# Patient Record
Sex: FEMALE | Race: WHITE | NOT HISPANIC OR LATINO | Employment: FULL TIME | URBAN - METROPOLITAN AREA
[De-identification: names, ages, dates, MRNs, and addresses within clinical notes are randomized per-mention and may not be internally consistent; named-entity substitution may affect disease eponyms.]

---

## 2017-02-06 ENCOUNTER — VIRTUAL VISIT (OUTPATIENT)
Dept: FAMILY MEDICINE | Facility: OTHER | Age: 52
End: 2017-02-06

## 2017-02-06 NOTE — PROGRESS NOTES
"Date:   Clinician: Joel Wegener  Clinician NPI: [id number]  Patient: Esme Tovar  Patient : 1965  Patient Address: [address] [place] [id number]  Patient Phone: [phone]  Visit Protocol: URI  Patient Summary:  Esme is a 51 year old ( : 1965 ) female who initiated a Zip for a presumed sinus infection. When asked the question \"Do you have a [facility] primary care physician?\", Esme responded \"No\".     Her  symptoms started gradually 3 weeks ago  and consist of sore throat, nasal congestion, and post-nasal drainage.   She denies dysphagia, nausea, vomiting, itchy eyes, chills, malaise, fever, hoarse voice, ear pain, dyspnea, rhinitis, petechial or purpuric rash, cough, myalgias, chest pain, and loss of appetite. She denies a history of facial surgery.   Her moderate nasal secretions are yellow. Her moderate facial pain or pressure feels worse when bending over or leaning forward and is located on one side of her head. The facial pain or pressure started after the onset of other URI symptoms.  She has teeth pain and is confident the tooth pain is not from a cavity, recent dental work or other mouth problems. Esme has a mild headache. The headache did not start before her other symptoms and is located on one side of her head. Esme does not have a history of migraine headaches.   In the past year Esme has been diagnosed with one (1) episodes of sinusitis.   She has a mildly painful sore throat. The patient denies having white spots on the tonsils similar to a sample strep throat image provided. She has not been exposed to Strep. When asked to feel her neck she denied feeling enlarged lymph nodes. She denies axillary lymphadenopathy.    Esme denies having COPD or other chronic lung disease.   Pulse: self-reported pulse rate as: 12 beats in 10 seconds.    Weight (in lbs): 145   She states she is not pregnant and denies breastfeeding. She no " longer menstruates.   Esme does not smoke or use smokeless tobacco.   MEDICATIONS:  Birth control pill and citalopram (Celexa)  , ALLERGIES:  NKDA   Clinician Response:  Dear Esme,  Based on the information you have provided, you likely have  acute sinusitis, otherwise known as a sinus infection.   I am prescribing amoxicillin-Pot Clavulanate [Augmentin] 875-125mg. Take one tablet by mouth two times a day for 10 days. There are no refills with this prescription.   Sinus pressure occurs when the tissues lining your sinuses become swollen and inflamed. Afrin nasal spray decreases the swelling to provide the quickest and most effective relief from sinus pressure.  Use oxymetazoline (Afrin, or store brand) nasal spray. Spray once in each nostril twice per day for a maximum of 3 days. Using this medication more frequently or longer than recommended may cause nasal congestion to reoccur or worsen. This is an over-the-counter medication you can find at most pharmacies.   Unless your are allergic to the over-the-counter medication(s) below, I recommend using:   Ibuprofen. Take 1-3 200mg tablets (200-600mg) every 8 hours to help with the discomfort. Make sure to take the ibuprofen with food. Do not exceed 2400mg in 24 hours. This is an over-the-counter medication that does not require a prescription.   Try the following to help with your throat pain and discomfort:     Use throat lozenges    Gargle with warm salt water (1/4 teaspoon of salt per 8 ounce glass of water).    Suck on frozen items such as Popsicles or ice cubes.     Call 911 or go to the emergency room if you feel that your throat is closing off, you suddenly develop a rash, you are unable to swallow fluids, you are drooling, or you are having difficulty breathing.  Follow up with your primary care clinician if your symptoms are not improving in 3-4 days.   Drink plenty of liquids, especially water and take time to rest your body. This may mean taking a  nap or going to bed earlier. Your body is fighting an infection and liquids and rest will improve the pace of recovery. Remember to regularly wash your hands and avoid close contact with others to prevent spreading your infection.   Some people develop allergies to antibiotics. If you notice a new rash, significant swelling or difficulty breathing, stop the medication immediately and go into a clinic for physical evaluation.   Some women may also develop a yeast infection as a side effect of taking antibiotics. If you notice symptoms of a yeast infection, please use [facility] to get treatment.   If you become pregnant during this course of treatment with medication, stop taking the medication and contact your primary care clinician.   Diagnosis: Acute Sinusitis  Diagnosis ICD: J01.90  Prescription: amoxicillin-Pot Clavulanate (Augmentin) 875-125 mg oral tablet 20 tablets, 10 days supply. Take one tablet by mouth two times a day for 10 days. Refills: 0, Refill as needed: no, Allow substitutions: yes  Prescription Sent At: February 06 09:06:22, 2017  Pharmacy: [facility] - [phone] - [address] [place] [address]

## 2018-03-12 ENCOUNTER — VIRTUAL VISIT (OUTPATIENT)
Dept: FAMILY MEDICINE | Facility: OTHER | Age: 53
End: 2018-03-12

## 2018-03-12 NOTE — PROGRESS NOTES
"Date:   Clinician: Isak Jones  Clinician NPI: [id number]  Patient: Esme Tovar  Patient : 1965  Patient Address: [address] [place] [address], [place] [address]  Patient Phone: [phone]  Visit Protocol: URI  Patient Summary:  Esme is a 53 year old ( : 1965 ) female who initiated a Visit for cold, sinus infection, or influenza. When asked the question \"Please sign me up to receive news, health information and promotions. \", Esme responded \"No\".    Esme states her symptoms started gradually 10-13 days ago.   Her symptoms consist of malaise, a headache, rhinitis, tooth pain, a cough, nasal congestion, and facial pain or pressure.   Symptom details     Nasal secretions: The color of her mucus is yellow.    Cough: Esme coughs a few times an hour and her cough is not more bothersome at night. Phlegm does not come into her throat when she coughs.     Facial pain or pressure: The facial pain or pressure feels worse when bending over or leaning forward.     Headache: She states the headache is moderate (between 4-6 on a 10 point pain scale).     Tooth pain: The tooth pain is not caused by a cavity, recent dental work, or other mouth problems.      Esme denies having wheezing, myalgias, enlarged lymph nodes, sore throat, fever, dyspnea, ear pain, and chills. She also denies taking antibiotic medication for the symptoms, double sickening (worsening symptoms after initial improvement), and having recent facial or sinus surgery in the past 60 days.   She has not recently been exposed to someone with influenza. Esme has been in close contact with the following high risk individuals: people with asthma, heart disease or diabetes.   Esme had 1 sinus infection within the past year.   Weight: 148 lbs   Esme does not smoke or use smokeless tobacco.   She denies pregnancy and denies breastfeeding. She does not menstruate.   MEDICATIONS:  Birth control pill " and citalopram (Celexa)  , ALLERGIES:  NKDA   Clinician Response:  Dear Esme,  Based on the information provided, you have acute bacterial sinusitis, also known as a sinus infection. Sinus infections are caused by bacteria or a virus and symptoms are almost always identical. The difference between the 2 types of infections is timing.  Sinus infections start as viral infections and symptoms improve on their own in about 7 days. If symptoms have not improved after 7 days or have even worsened, a bacterial infection may have developed.  Medication information  I am prescribing:     Amoxicillin 500 mg oral tablet. Take 1 tablet by mouth 3 times a day for 10 days. There are no refills with this prescription.   Antibiotics can cause an allergic reaction even if you have taken them without a problem in the past. If you develop a new rash, swelling, or difficulty breathing, stop the medication and be seen in a clinic or urgent care immediately.  A yeast infection is a side effect of taking antibiotics in some women. Please use [facility] to get treatment if you have symptoms of a yeast infection.  If you become pregnant during this course of treatment, stop taking the medication and contact your primary care provider.  Self care  The following tips will keep you as comfortable as possible while you recover:     Rest    Drink plenty of water and other liquids    Take a hot shower to loosen congestion    Take a spoonful of honey to reduce your cough     When to seek care  Please be seen in a clinic or urgent care if any of the following occur:     Symptoms do not start to improve after 3 days of treatment    New symptoms develop, or symptoms become worse      Diagnosis: Acute bacterial sinusitis  Diagnosis ICD: J01.90  Prescription: amoxicillin 500 mg oral tablet 30 tablets, 10 days supply. Take 1 tablet by mouth 3 times a day for 10 days. Refills: 0, Refill as needed: no, Allow substitutions: yes  Pharmacy: [facility]  [address] - [phone] - [address], [place] [address]

## 2019-05-23 ENCOUNTER — OFFICE VISIT (OUTPATIENT)
Dept: DERMATOLOGY | Facility: CLINIC | Age: 54
End: 2019-05-23
Payer: COMMERCIAL

## 2019-05-23 DIAGNOSIS — D23.9 DILATED PORE OF WINER: ICD-10-CM

## 2019-05-23 DIAGNOSIS — D18.01 CHERRY ANGIOMA: ICD-10-CM

## 2019-05-23 DIAGNOSIS — L57.8 SUN-DAMAGED SKIN: ICD-10-CM

## 2019-05-23 DIAGNOSIS — D48.9 NEOPLASM OF UNCERTAIN BEHAVIOR: ICD-10-CM

## 2019-05-23 DIAGNOSIS — L81.4 SOLAR LENTIGO: Primary | ICD-10-CM

## 2019-05-23 DIAGNOSIS — D22.9 MULTIPLE BENIGN NEVI: ICD-10-CM

## 2019-05-23 DIAGNOSIS — L81.1 MELASMA: ICD-10-CM

## 2019-05-23 DIAGNOSIS — L82.1 SEBORRHEIC KERATOSIS: ICD-10-CM

## 2019-05-23 PROCEDURE — 88305 TISSUE EXAM BY PATHOLOGIST: CPT | Mod: TC | Performed by: DERMATOLOGY

## 2019-05-23 PROCEDURE — 11102 TANGNTL BX SKIN SINGLE LES: CPT | Performed by: DERMATOLOGY

## 2019-05-23 PROCEDURE — 99203 OFFICE O/P NEW LOW 30 MIN: CPT | Mod: 25 | Performed by: DERMATOLOGY

## 2019-05-23 RX ORDER — CITALOPRAM HYDROBROMIDE 20 MG/1
TABLET ORAL
Refills: 3 | COMMUNITY
Start: 2019-05-04

## 2019-05-23 RX ORDER — ESTRADIOL 0.07 MG/D
FILM, EXTENDED RELEASE TRANSDERMAL
Refills: 0 | COMMUNITY
Start: 2019-03-24

## 2019-05-23 NOTE — NURSING NOTE
@Esme Tovar's goals for this visit include:   Chief Complaint   Patient presents with     Skin Check     lesion of concern on back       She requests these members of her care team be copied on today's visit information: NO    PCP: Raegan De Souza    Referring Provider:  No referring provider defined for this encounter.    There were no vitals taken for this visit.    Do you need any medication refills at today's visit? NO    Nyasia Guerrero CMA

## 2019-05-23 NOTE — PROGRESS NOTES
[facility] Dermatology Note      Dermatology Problem List:  1. NUB, left zygomatic cheek, s/p biopsy 5/23/2019  2. Melasma: Not treating      Encounter Date: May 23, 2019    CC:  Chief Complaint   Patient presents with     Skin Check     lesion of concern on back         History of Present Illness:  Ms. Esme Tovar is a 54 year old female who presents in self referral for a skin check. Today, she has a lesion of concern on her back. It is raised and has a black center. She was previously seen at [facility] and a lesion biopsied on her nose. She was told it was inflammation, like a pimple. No other concerns addressed today.      Past Medical History:   Patient Active Problem List   Diagnosis     Myopia     Presbyopia     Social History:  Patient reports that she has never smoked. She has never used smokeless tobacco. She reports that she drinks alcohol.works for [employer]. 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       Medications:  Current Outpatient Medications   Medication Sig Dispense Refill     citalopram (CELEXA) 20 MG tablet   3     estradiol (VIVELLE-DOT) 0.075 MG/24HR BIW patch   0       No Known Allergies    Review of Systems:  -Constitutional: Patient is otherwise feeling well, in usual state of health.   -Skin: As above in HPI. No additional skin concerns.    Physical exam:  Vitals: There were no vitals taken for this visit.  GEN: This is a well developed, well-nourished female in no acute distress, in a pleasant mood.    SKIN: Total skin excluding the undergarment areas was performed. The exam included the head/face, neck, both arms, chest, back, abdomen, both legs, digits and/or nails and buttocks.   - Mchugh Type II  - Scattered brown macules on sun exposed areas.  - Faint hyperpigmentation on the lateral and inferior cheeks  - 3 mm pink pearly papule with sharply in focus vessels with black clods in it  -There are dome shaped  bright red papules on the trunk.   - Multiple regular brown pigmented macules and papules are identified on the trunk.   - There are waxy stuck on tan to brown papules on the trunk.  - dilated pore of stephen on her left mid back (area of pt concern)  - No other lesions of concern on areas examined.               Impression/Plan:    1. Sun damaged skin with solar lentigines    Recommend sunscreens SPF #30 or greater, protective clothing and avoidance of tanning beds.    2. Benign findings including: seborrheic keratoses, cherry angioma, dilated pore of stephen    No further intervention required. Patient to report changes.     Patient reassured of the benign nature of these lesions.    3. Multiple clinically benign nevi    No further intervention required. Patient to report changes.     Patient reassured ofthe  benign nature of these lesions.    4. Melasma. Discussed pathogenesis of this condition.     Recommend sunscreens SPF #30 or greater, protective clothing and avoidance of tanning beds.    5. NUB, left zygomatic cheek. 3 mm pink pearly papule with sharply in focus vessels with black clods in it. Ddx: BCC vs nevus    Shave biopsy:  After discussion of benefits and risks including but not limited to bleeding/bruising, pain/swelling, infection, scar, incomplete removal, nerve damage/numbness, recurrence, and non-diagnostic biopsy, written consent, verbal consent and photographs were obtained. Time-out was performed. The area was cleaned with isopropyl alcohol. 0.5ml of 1% lidocaine with 1:100,000 epinephrine was injected to obtain adequate anesthesia. A shave biopsy was performed. Hemostasis was achieved with aluminium chloride. Vaseline and a sterile dressing were applied. The patient tolerated the procedure and no complications were noted. The patient was provided with verbal and written post care instructions.    Follow-up pending biopsy results, 1 year for skin exam, sooner for lesions of concern.       Staff  Involved:  Scribe/Staff    Scribe Disclosure  I, Deloris Jairo, am serving as a scribe to document services personally performed by Dr. Na Camacho MD, based on data collection and the provider's statements to me.     Provider Disclosure:   The documentation recorded by the scribe accurately reflects the services I personally performed and the decisions made by me.    Na Camacho MD    Department of Dermatology  [facility]: Phone: [phone], Fax:[phone]  [facility]: Phone: [phone], Fax: [phone]

## 2019-05-23 NOTE — PATIENT INSTRUCTIONS

## 2019-05-23 NOTE — LETTER
5/23/2019         RE: Esme Tovar  [address] [facility] [facility] [address]        Dear Colleague,    Thank you for referring your patient, Esme Tovar, to the [facility]. Please see a copy of my visit note below.    [facility] Dermatology Note      Dermatology Problem List:  1. NUB, left zygomatic cheek, s/p biopsy 5/23/2019  2. Melasma: Not treating      Encounter Date: May 23, 2019    CC:  Chief Complaint   Patient presents with     Skin Check     lesion of concern on back         History of Present Illness:  Ms. Esme Tovar is a 54 year old female who presents in self referral for a skin check. Today, she has a lesion of concern on her back. It is raised and has a black center. She was previously seen at [facility] and a lesion biopsied on her nose. She was told it was inflammation, like a pimple. No other concerns addressed today.      Past Medical History:   Patient Active Problem List   Diagnosis     Myopia     Presbyopia     Social History:  Patient reports that she has never smoked. She has never used smokeless tobacco. 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       Medications:  Current Outpatient Medications   Medication Sig Dispense Refill     citalopram (CELEXA) 20 MG tablet   3     estradiol (VIVELLE-DOT) 0.075 MG/24HR BIW patch   0       No Known Allergies    Review of Systems:  -Constitutional: Patient is otherwise feeling well, in usual state of health.   -Skin: As above in HPI. No additional skin concerns.    Physical exam:  Vitals: There were no vitals taken for this visit.  GEN: This is a well developed, well-nourished female in no acute distress, in a pleasant mood.    SKIN: Total skin excluding the undergarment areas was performed. The exam included the head/face, neck, both arms, chest, back, abdomen, both legs, digits and/or  nails and buttocks.   - Mchugh Type II  - Scattered brown macules on sun exposed areas.  - Faint hyperpigmentation on the lateral and inferior cheeks  - 3 mm pink pearly papule with sharply in focus vessels with black clods in it  -There are dome shaped bright red papules on the trunk.   - Multiple regular brown pigmented macules and papules are identified on the trunk.   - There are waxy stuck on tan to brown papules on the trunk.  - dilated pore of stephen on her left mid back (area of pt concern)  - No other lesions of concern on areas examined.               Impression/Plan:    1. Sun damaged skin with solar lentigines    Recommend sunscreens SPF #30 or greater, protective clothing and avoidance of tanning beds.    2. Benign findings including: seborrheic keratoses, cherry angioma, dilated pore of stephen    No further intervention required. Patient to report changes.     Patient reassured of the benign nature of these lesions.    3. Multiple clinically benign nevi    No further intervention required. Patient to report changes.     Patient reassured ofthe  benign nature of these lesions.    4. Melasma. Discussed pathogenesis of this condition.     Recommend sunscreens SPF #30 or greater, protective clothing and avoidance of tanning beds.    5. NUB, left zygomatic cheek. 3 mm pink pearly papule with sharply in focus vessels with black clods in it. Ddx: BCC vs nevus    Shave biopsy:  After discussion of benefits and risks including but not limited to bleeding/bruising, pain/swelling, infection, scar, incomplete removal, nerve damage/numbness, recurrence, and non-diagnostic biopsy, written consent, verbal consent and photographs were obtained. Time-out was performed. The area was cleaned with isopropyl alcohol. 0.5ml of 1% lidocaine with 1:100,000 epinephrine was injected to obtain adequate anesthesia. A shave biopsy was performed. Hemostasis was achieved with aluminium chloride. Vaseline and a sterile dressing  were applied. The patient tolerated the procedure and no complications were noted. The patient was provided with verbal and written post care instructions.    Follow-up pending biopsy results, 1 year for skin exam, sooner for lesions of concern.       Staff Involved:  Scribe/Staff    Scribe Disclosure  I, Deloris Gill, am serving as a scribe to document services personally performed by Dr. Na Camacho MD, based on data collection and the provider's statements to me.     Provider Disclosure:   The documentation recorded by the scribe accurately reflects the services I personally performed and the decisions made by me.    Na Camacho MD    Department of Dermatology  [facility]: Phone: [phone], Fax:[phone]  [facility]: Phone: [phone], Fax: [phone]              Again, thank you for allowing me to participate in the care of your patient.        Sincerely,        Na Camacho MD

## 2019-05-29 LAB — COPATH REPORT: NORMAL

## 2019-06-27 ENCOUNTER — OFFICE VISIT (OUTPATIENT)
Dept: DERMATOLOGY | Facility: CLINIC | Age: 54
End: 2019-06-27
Payer: COMMERCIAL

## 2019-06-27 VITALS — HEART RATE: 63 BPM | SYSTOLIC BLOOD PRESSURE: 120 MMHG | OXYGEN SATURATION: 99 % | DIASTOLIC BLOOD PRESSURE: 68 MMHG

## 2019-06-27 DIAGNOSIS — C44.319 BASAL CELL CARCINOMA (BCC) OF LEFT LATERAL CHEEK: Primary | ICD-10-CM

## 2019-06-27 PROCEDURE — 13131 CMPLX RPR F/C/C/M/N/AX/G/H/F: CPT | Mod: 51 | Performed by: DERMATOLOGY

## 2019-06-27 PROCEDURE — 17311 MOHS 1 STAGE H/N/HF/G: CPT | Performed by: DERMATOLOGY

## 2019-06-27 ASSESSMENT — PAIN SCALES - GENERAL: PAINLEVEL: NO PAIN (0)

## 2019-06-27 NOTE — PROGRESS NOTES
[facility] Mohs Dermatologic Surgery Procedure Note    Date of Service:  Jun 27, 2019  Surgery: Mohs micrographic surgery    Case 1  Repair Type: complex  Repair Size: 1.6 cm  Suture Material: Fast Absorbing Gut 5-0, 5-0 Monocryl  Tumor Type: BCC - Basal cell carcinoma  Location: left zygomatic cheek  Derm-Path Accession #: [id number]  PreOp Size: 0.4x0.4 cm  PostOp Size: 0.8x0.8 cm  Mohs Accession #: [id number]  Level of Defect: fat      Procedure:  We discussed the principles of treatment and most likely complications including scarring, bleeding, infection, swelling, pain, crusting, nerve damage, large wound,  incomplete excision, wound dehiscence,  nerve damage, recurrence, and a second procedure may be recommended to obtain the best cosmetic or functional result.    Informed consent was obtained and the patient underwent the procedure as follows:  The patient was placed supine on the operating table.  The cancer was identified, outlined with a marker, and verified by the patient.  The entire surgical field was prepped with Hibiclens.  The surgical site was anesthetized using Lidocaine 1% with epi 1:100,000.      The area of clinically apparent tumor was debulked. The layer of tissue was then surgically excised using a #15 blade and was then transferred onto a specimen sheet maintaining the orientation of the specimen. Hemostasis was obtained using bipolar electrocoagulation. The wound site was then covered with a dressing while the tissue samples were processed for examination.    The excised tissue was transported to the Mohs histology laboratory maintaining the tissue orientation.  The tissue specimen was relaxed so that the entire surgical margin was in a a single horizontal plane for sectioning and inked for precise mapping.  A precise reference map was drawn to reflect the sectioning of the specimen, colored inking of the margins, and orientation on the patient. The tissue was processed  using horizontal sectioning of the base and continuous peripheral margins.  The histopathologic sections were reviewed in conjunction with the reference map.    Total blocks: 1    Total slides:  1    There were no cancer cells visualized on examination, therefore Mohs surgery was complete.    Reconstruction: Complex Closure    Primary Surgeon: Dr. Chase Monge     The patient was taken to the operative suite and placed on the operating room table. The defect was identified. Appropriate markings were made with a marking pen to plan the repair.  The area was infiltrated with Lidocaine 1% with epi 1:100,000, prepped with Hibiclens, and draped with sterile towels.     The wound was debeveled and undermined widely. Cones were excised within relaxed skin tension lines on both sides of the defect. Hemostasis was obtained using bipolar electrocoagulation. The wound was narrowed with SMAS placation and the dermis and subcutaneous tissue were then approximated using buried vertical mattress sutures. The wound edges were then approximated additional buried sutures were placed in a similar fashion where needed.  Percutaneous sutures were carefully placed for maximum eversion and meticulous approximation.    Repair Size: 1.6 cm  Sutures Used: 5-0 Monocryl, 5-0 Fast Absorbing Gut      The wound was cleansed with saline and ointment was applied along the wound surface.     A sterile pressure dressing was applied. Wound care instructions were given verbally and in writing. The patient left the operating suite in stable condition. Patient was informed that additional refinement of the resulting surgical scar may be used as a second stage of this reconstruction.           Staff involved:  Scribe Disclosure:  Luciana FLORIAN, am serving as a scribe to document services personally performed by Dr. Chase Monge DO, based on data collection and the provider's statements to me.     Provider Disclosure:   The documentation recorded by the  sharad accurately reflects the services I personally performed and the decisions made by me.  I personally performed the procedures today.    Chase Monge DO    Department of Dermatology  [facility]: Phone: [phone], Fax:[phone]  [facility]: Phone: [phone], Fax: [phone]    Performed at:   [facility]    [address] [place] [address]

## 2019-06-27 NOTE — NURSING NOTE
Esme Tovar's goals for this visit include:   Chief Complaint   Patient presents with     Procedure     MOHS left zygomatic cheek BCC       She requests these members of her care team be copied on today's visit information:     PCP: Raegan De Souza    Referring Provider:  No referring provider defined for this encounter.    /68 (BP Location: Right arm, Patient Position: Sitting, Cuff Size: Adult Regular)   Pulse 63   SpO2 99%     Do you need any medication refills at today's visit? No    Mariaelena Hughes LPN

## 2019-06-27 NOTE — PATIENT INSTRUCTIONS
Mohs Wound Care Instructions  I will experience scar, altered skin color, bleeding, swelling, pain, crusting and redness. I may experience altered sensation. Risks are excessive bleeding, infection, muscle weakness, thick (hypertrophic or keloidal) scar, and recurrence,. A second procedure may be recommended to obtain the best cosmetic or functional result.  Possible complications of any surgical procedure are bleeding, infection, scarring, alteration in skin color and sensation, muscle weakness in the area, wound dehiscence or seperation, or recurrence of the lesion or disease. On occasion, after healing, a secondary procedure or revision may be recommended in order to obtain the best cosmetic or functional result.   After your surgery, a pressure bandage will be placed over the area that has sutures. This will help prevent bleeding. Please follow these instructions until you come back to clinic for suture removal in 1 week, as they will help you to prevent complications as your wound heals.  For the First 48 hours After Surgery:  1. Leave the pressure bandage on and keep it dry. If it should come loose, you may retape it, but do not take it off.  2. Relax and take it easy. Do not do any vigorous exercise, heavy lifting, or bending forward. This could cause the wound to bleed.  3. Post-operative pain is usually mild. You may take plain or extra strength Tylenol every 4 hours as needed (do not take more than 4,000mg in one day). Do not take any medicine that contains aspirin, ibuprofen or motrin unless you have been recommended these by a doctor.  Avoid alcohol and vitamin E as these may increase your tendency to bleed.  4. You may put an ice pack around the bandaged area for 20 minutes every 2-3 hours. This may help reduce swelling, bruising, and pain. Make sure the ice pack is waterproof so that the pressure bandage does not get wet.   5. You may see a small amount of drainage or blood on your pressure bandage.  This is normal. However, if drainage or bleeding continues or saturates the bandage, you will need to apply firm pressure over the bandage with a washcloth for 15 minutes. If bleeding continues after applying pressure for 15 minutes then go to the nearest emergency room.  48 Hours After Surgery  Carefully remove the bandage and start daily wound care and dressing changes. You may also now shower and get the wound wet. Wash wound with a mild soap and water.  Use caution when washing the wound. Be gentle and do not let the forceful shower stream hit the wound directly.  PAT dry.  Daily Wound Care:  1. Wash wound with a mild soap and water.  Use caution when washing the wound, be gentle and do not let the forceful shower stream hit the wound directly.  2. PAT DRY.  3. Apply Vaseline (from a new container or tube) over the suture line with a Q-tip. It is very important to keep the wound continuously moist, as wounds heal best in a moist environment.  4.  Keep the site covered until sutures are removed, you can cover it with a Telfa (non-stick) dressing and tape or a band-aid.    5. If you are unable to keep wound covered, you must apply Vaseline every 2 - 3 hours (while awake) to ensure it is being kept moist for optimal healing. A dressing overnight is recommended to keep the area moist.   Call Us If:  1. You have pain that is not controlled with Tylenol.  2. You have signs or symptoms of an infection, such as: fever over 100 degrees F, redness, warmth, or foul-smelling or yellow/creamy drainage from the wound.  Who should I call with questions?    [facility]: [phone]     [facility]: [phone]    For urgent needs outside of business hours call the [facility] at [phone] and ask for the dermatology resident on call

## 2019-06-27 NOTE — NURSING NOTE
Mupirocin, Vaseline and pressure dressing applied to Mohs site on left zygomatic cheek.  Wound care instructions reviewed with patient and AVS provided.  Patient verbalized understanding. No further questions or concerns at this time.      The following medication was given:     MEDICATION:  Lidocaine with epinephrine 1% 1:[id number]  ROUTE: SQ  SITE: see procedure note  DOSE: 2cc  LOT #: -[id number]  : Hospira  EXPIRATION DATE: 1-apr-2020  NDC#: 6264-3220-90   Was there drug waste? 1cc  Multi-dose vial: Yes    Mariaelena Hughes LPN  June 27, 2019    Prior to injection, verified patient identity using patient's name and date of birth.      Lido with epi 1%    Drug Amount Wasted:  Yes: 1 mg/ml   Vial/Syringe: Multi dose vial  Expiration Date:  1-apr-2020    Mariaelena Hughes LPN

## 2019-06-27 NOTE — LETTER
6/27/2019         RE: Esme Tovar  [address] [address] [place] [id number]        Dear Colleague,    Thank you for referring your patient, Esme Tovar, to the [facility]. Please see a copy of my visit note below.    [facility] Dermatologic Surgery Procedure Note    Date of Service:  Jun 27, 2019  Surgery: Mohs micrographic surgery    Case 1  Repair Type: complex  Repair Size: 1.6 cm  Suture Material: Fast Absorbing Gut 5-0, 5-0 Monocryl  Tumor Type: BCC - Basal cell carcinoma  Location: left zygomatic cheek  Derm-Path Accession #: [id number]  PreOp Size: 0.4x0.4 cm  PostOp Size: 0.8x0.8 cm  Mohs Accession #: [id number]  Level of Defect: fat      Procedure:  We discussed the principles of treatment and most likely complications including scarring, bleeding, infection, swelling, pain, crusting, nerve damage, large wound,  incomplete excision, wound dehiscence,  nerve damage, recurrence, and a second procedure may be recommended to obtain the best cosmetic or functional result.    Informed consent was obtained and the patient underwent the procedure as follows:  The patient was placed supine on the operating table.  The cancer was identified, outlined with a marker, and verified by the patient.  The entire surgical field was prepped with Hibiclens.  The surgical site was anesthetized using Lidocaine 1% with epi 1:100,000.      The area of clinically apparent tumor was debulked. The layer of tissue was then surgically excised using a #15 blade and was then transferred onto a specimen sheet maintaining the orientation of the specimen. Hemostasis was obtained using bipolar electrocoagulation. The wound site was then covered with a dressing while the tissue samples were processed for examination.    The excised tissue was transported to the Mohs histology laboratory maintaining the tissue orientation.  The tissue specimen was relaxed so that the entire surgical  margin was in a a single horizontal plane for sectioning and inked for precise mapping.  A precise reference map was drawn to reflect the sectioning of the specimen, colored inking of the margins, and orientation on the patient. The tissue was processed using horizontal sectioning of the base and continuous peripheral margins.  The histopathologic sections were reviewed in conjunction with the reference map.    Total blocks: 1    Total slides:  1    There were no cancer cells visualized on examination, therefore Mohs surgery was complete.    Reconstruction: Complex Closure    Primary Surgeon: Dr. Chase Monge     The patient was taken to the operative suite and placed on the operating room table. The defect was identified. Appropriate markings were made with a marking pen to plan the repair.  The area was infiltrated with Lidocaine 1% with epi 1:100,000, prepped with Hibiclens, and draped with sterile towels.     The wound was debeveled and undermined widely. Cones were excised within relaxed skin tension lines on both sides of the defect. Hemostasis was obtained using bipolar electrocoagulation. The wound was narrowed with SMAS placation and the dermis and subcutaneous tissue were then approximated using buried vertical mattress sutures. The wound edges were then approximated additional buried sutures were placed in a similar fashion where needed.  Percutaneous sutures were carefully placed for maximum eversion and meticulous approximation.    Repair Size: 1.6 cm  Sutures Used: 5-0 Monocryl, 5-0 Fast Absorbing Gut      The wound was cleansed with saline and ointment was applied along the wound surface.     A sterile pressure dressing was applied. Wound care instructions were given verbally and in writing. The patient left the operating suite in stable condition. Patient was informed that additional refinement of the resulting surgical scar may be used as a second stage of this reconstruction.           Staff  involved:  Scribe Disclosure:  I, Luciana Turpin, am serving as a scribe to document services personally performed by Dr. Chase Monge DO, based on data collection and the provider's statements to me.     Provider Disclosure:   The documentation recorded by the scribe accurately reflects the services I personally performed and the decisions made by me.  I personally performed the procedures today.    Chase Monge DO    Department of Dermatology  [facility]: Phone: [phone], Fax:[phone]  [facility]: Phone: [phone], Fax: [phone]    Performed at:   [facility]    [address] [place] [address]      Again, thank you for allowing me to participate in the care of your patient.        Sincerely,        Chase Monge MD

## 2019-08-15 ENCOUNTER — ALLIED HEALTH/NURSE VISIT (OUTPATIENT)
Dept: NURSING | Facility: CLINIC | Age: 54
End: 2019-08-15
Payer: COMMERCIAL

## 2019-08-15 DIAGNOSIS — Z51.89 VISIT FOR WOUND CHECK: Primary | ICD-10-CM

## 2019-08-15 PROCEDURE — 99207 ZZC NO CHARGE NURSE ONLY: CPT | Performed by: DERMATOLOGY

## 2019-08-15 ASSESSMENT — PAIN SCALES - GENERAL: PAINLEVEL: MILD PAIN (2)

## 2019-08-15 NOTE — NURSING NOTE
Esme Tovar comes into clinic today at the request of Dr. Monge Ordering Provider for wound check.    Esme is 7 weeks s/p Mohs to left medial cheek.  She presented to clinic today due to weeping and redness at the site.  She states her cheek was stuck to her pillow a few days ago.  She states the wound is mildly tender if touched.  She denies bleeding or swelling.  She has been washing it daily and applying bacitracin.    On exam, the central portion of the incision site has dehisced and the wound is healing by granulation.  The wound edges are pink.  No bleeding or swelling noted.    Photo taken.  Wound was cleansed with Hibiclens and rinsed with sterile water.  Mupirocin ointment applied followed by bandage.    I advised patient to discontinue use of bacitracin.  I provided reassurance that the wound will continue to heal from the inside out.  I recommend daily washes with mild soap, application of Vaseline and to keep it covered.    I will review the photo with Dr. Monge and call her back with any changes to the plan.  She verbalized understanding and agreed with the plan.    This service provided today was under the supervising provider of the day Dr. Monge, who was available if needed.    Tish Castro RN

## 2019-08-15 NOTE — Clinical Note
Please review photo taken today.  I did obtain a culture, but don't feel it is warranted.  I can send it if you'd like to.  Any changes to my recommendation?

## 2019-08-15 NOTE — NURSING NOTE
I spoke with Dr. Monge who recommended vinegar soaks 1-2 times/day, continue with daily gentle cleansing wash followed by Vaseline.  Patient notified of the above and not to cover wound with a bandage any longer and to discontinue bacitracin.    She verbalized understanding and agreed with the plan.  Tish Castro RN

## 2019-10-03 ENCOUNTER — HEALTH MAINTENANCE LETTER (OUTPATIENT)
Age: 54
End: 2019-10-03

## 2019-12-12 ENCOUNTER — OFFICE VISIT (OUTPATIENT)
Dept: DERMATOLOGY | Facility: CLINIC | Age: 54
End: 2019-12-12
Payer: COMMERCIAL

## 2019-12-12 DIAGNOSIS — D22.9 MULTIPLE BENIGN NEVI: ICD-10-CM

## 2019-12-12 DIAGNOSIS — L82.1 SEBORRHEIC KERATOSIS: ICD-10-CM

## 2019-12-12 DIAGNOSIS — D18.01 CHERRY ANGIOMA: ICD-10-CM

## 2019-12-12 DIAGNOSIS — Z85.828 HISTORY OF NONMELANOMA SKIN CANCER: Primary | ICD-10-CM

## 2019-12-12 DIAGNOSIS — L81.4 SOLAR LENTIGO: ICD-10-CM

## 2019-12-12 DIAGNOSIS — L57.8 SUN-DAMAGED SKIN: ICD-10-CM

## 2019-12-12 DIAGNOSIS — L85.3 XEROSIS CUTIS: ICD-10-CM

## 2019-12-12 PROCEDURE — 99214 OFFICE O/P EST MOD 30 MIN: CPT | Performed by: DERMATOLOGY

## 2019-12-12 NOTE — NURSING NOTE
Esme Tovar's goals for this visit include:   Chief Complaint   Patient presents with     Derm Problem     skin check- no concerns today        She requests these members of her care team be copied on today's visit information: yes    PCP: Raegan De Souza    Referring Provider:  No referring provider defined for this encounter.    There were no vitals taken for this visit.    Do you need any medication refills at today's visit? No     Amorrvero Ocasio CMA

## 2019-12-12 NOTE — LETTER
12/12/2019         RE: Esme Tovar  [address] [facility] [address]        Dear Colleague,    Thank you for referring your patient, Esme Tovar, to the [facility]. Please see a copy of my visit note below.    [facility] Dermatology Note    Dermatology Problem List:  1. Hx of NMSC  - infundibulocystic BCC, left zygomatic cheek, s/p biopsy 5/23/2019, s/p Mohs 6/17/19  2. Melasma, not treating  3. Eczema, mild  -Current t/x: cream based moisturizer    Encounter Date: Dec 12, 2019    CC:  Chief Complaint   Patient presents with     Derm Problem     skin check- no concerns today      History of Present Illness:  Ms. Esme Tovar is a 54 year old female who presents as a follow-up skin check. She has a history of infundibulocystic BCC. She was last seen on 6/27/19 when this skin cancer was treated with Mohs surgery. Today she reports no new or changing lesions since her last visit but she is unsure if that means there will be no new skin findings because she did not notice her previous skin cancer. Denies any tender, nonhealing, bleeding skin lesions. Her Mohs site has healed well and does not cause her any problems. She wears a face sunscreen with SPF 50 everyday. She additionally notices that her skin (especially her arms) are very dry and itchy. She currently moisturizes with Jergens. No other concerns addressed today.    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     Family History:  No family history of skin cancer.   Reviewed and left in chart for clinician convenience.     Medications:  Current Outpatient Medications   Medication Sig Dispense Refill     citalopram (CELEXA) 20 MG tablet   3     estradiol (VIVELLE-DOT) 0.075 MG/24HR BIW patch   0       No Known  Allergies    Review of Systems:  -Constitutional: Patient is otherwise feeling well, in usual state of health.   -Skin: As above in HPI. No additional skin concerns.    Physical exam:  Vitals: There were no vitals taken for this visit.  GEN: This is a well developed, well-nourished female in no acute distress, in a pleasant mood.    SKIN: Total skin excluding the undergarment areas was performed. The exam included the head/face, neck, both arms, chest, back, abdomen, both legs, digits and/or nails and buttocks.   - Mchugh Type II  - Scattered brown macules on sun exposed areas.  - Skin is overall xerotic  - Well healed linear scar on the left zygomatic cheek  - Increased hand markings on the bilateral hands  - Dilated pore of stephen on the left mid back  - There are dome shaped bright red papules on the trunk.   - Multiple regular brown pigmented macules and papules are identified on the body.   - There are waxy stuck on tan to brown papules on the trunk.  - No other lesions of concern on areas examined.     Impression/Plan:  1. History of NMSC. No evidence of recurrence.   - Recommend sunscreens SPF #30 or greater, protective clothing and avoidance of tanning beds.   - Recommended skin exam in 6 months.    2. Sun damaged skin with solar lentigines  - Recommend sunscreens SPF #30 or greater, protective clothing and avoidance of tanning beds.    3. Benign findings including: seborrheic keratoses, cherry angioma  - No further intervention required. Patient to report changes.   - Patient reassured of the benign nature of these lesions.    4. Multiple clinically benign nevi  - No further intervention required. Patient to report changes.   - Patient reassured of the benign nature of these lesions.    5. Xerosis cutis. Discussed the pathogenesis of this condition and empathized the importance of gentle skin care and cream based moisturizer for long term treatment.   - I recommended a cream based moisturizer, like  CeraVe or Vanicream.     Follow-up in 6 months for skin check, sooner for lesions of concern.     Staff Involved:  Scribe/Staff    Scribe Disclosure  I, Anna Jordy, am serving as a scribe to document services personally performed by Dr. Na Camacho MD, based on data collection and the provider's statements to me.     Provider Disclosure:   The documentation recorded by the scribe accurately reflects the services I personally performed and the decisions made by me.    Na Camacho MD    Department of Dermatology  [facility]: Phone: [phone], Fax:[phone]  [facility]: Phone: [phone], Fax: [phone]              Again, thank you for allowing me to participate in the care of your patient.        Sincerely,        Na Camacho MD

## 2019-12-12 NOTE — PROGRESS NOTES
[facility] Dermatology Note    Dermatology Problem List:  1. Hx of NMSC  - infundibulocystic BCC, left zygomatic cheek, s/p biopsy 5/23/2019, s/p Mohs 6/17/19  2. Melasma, not treating  3. Eczema, mild  -Current t/x: cream based moisturizer    Encounter Date: Dec 12, 2019    CC:  Chief Complaint   Patient presents with     Derm Problem     skin check- no concerns today      History of Present Illness:  Ms. Esme Tovar is a 54 year old female who presents as a follow-up skin check. She has a history of infundibulocystic BCC. She was last seen on 6/27/19 when this skin cancer was treated with Mohs surgery. Today she reports no new or changing lesions since her last visit but she is unsure if that means there will be no new skin findings because she did not notice her previous skin cancer. Denies any tender, nonhealing, bleeding skin lesions. Her Mohs site has healed well and does not cause her any problems. She wears a face sunscreen with SPF 50 everyday. She additionally notices that her skin (especially her arms) are very dry and itchy. She currently moisturizes with Jergens. No other concerns addressed today.    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     Family History:  No family history of skin cancer.   Reviewed and left in chart for clinician convenience.     Medications:  Current Outpatient Medications   Medication Sig Dispense Refill     citalopram (CELEXA) 20 MG tablet   3     estradiol (VIVELLE-DOT) 0.075 MG/24HR BIW patch   0       No Known Allergies    Review of Systems:  -Constitutional: Patient is otherwise feeling well, in usual state of health.   -Skin: As above in HPI. No additional skin concerns.    Physical exam:  Vitals: There were no vitals taken for this visit.  GEN: This is a well developed,  well-nourished female in no acute distress, in a pleasant mood.    SKIN: Total skin excluding the undergarment areas was performed. The exam included the head/face, neck, both arms, chest, back, abdomen, both legs, digits and/or nails and buttocks.   - Mchugh Type II  - Scattered brown macules on sun exposed areas.  - Skin is overall xerotic  - Well healed linear scar on the left zygomatic cheek  - Increased hand markings on the bilateral hands  - Dilated pore of stephen on the left mid back  - There are dome shaped bright red papules on the trunk.   - Multiple regular brown pigmented macules and papules are identified on the body.   - There are waxy stuck on tan to brown papules on the trunk.  - No other lesions of concern on areas examined.     Impression/Plan:  1. History of NMSC. No evidence of recurrence.   - Recommend sunscreens SPF #30 or greater, protective clothing and avoidance of tanning beds.   - Recommended skin exam in 6 months.    2. Sun damaged skin with solar lentigines  - Recommend sunscreens SPF #30 or greater, protective clothing and avoidance of tanning beds.    3. Benign findings including: seborrheic keratoses, cherry angioma  - No further intervention required. Patient to report changes.   - Patient reassured of the benign nature of these lesions.    4. Multiple clinically benign nevi  - No further intervention required. Patient to report changes.   - Patient reassured of the benign nature of these lesions.    5. Xerosis cutis. Discussed the pathogenesis of this condition and empathized the importance of gentle skin care and cream based moisturizer for long term treatment.   - I recommended a cream based moisturizer, like CeraVe or Vanicream.     Follow-up in 6 months for skin check, sooner for lesions of concern.     Staff Involved:  Scribe/Staff    Scribe Disclosure  I, Anna Spence, am serving as a scribe to document services personally performed by Dr. Na Camacho MD, based on  data collection and the provider's statements to me.     Provider Disclosure:   The documentation recorded by the scribe accurately reflects the services I personally performed and the decisions made by me.    Na Camacho MD    Department of Dermatology  [facility]: Phone: [phone], Fax:[phone]  [facility]: Phone: [phone], Fax: [phone]

## 2019-12-12 NOTE — PATIENT INSTRUCTIONS
I recommend a cream based moisturizer, like CeraVe or Vanicream.     Elta MD zinc oxide sunscreen can be found on [email or web address]

## 2020-05-11 ENCOUNTER — VIRTUAL VISIT (OUTPATIENT)
Dept: FAMILY MEDICINE | Facility: OTHER | Age: 55
End: 2020-05-11

## 2020-05-11 NOTE — PROGRESS NOTES
"Date: 2020 11:06:27  Clinician: Elly Tafoya  Clinician NPI: [id number]  Patient: Esme Tovar  Patient : 1965  Patient Address: [address] [place] [id number]  Patient Phone: [phone]  Visit Protocol: URI  Patient Summary:  Esme is a 55 year old ( : 1965 ) female who initiated a Visit for cold, sinus infection, or influenza. When asked the question \"Please sign me up to receive news, health information and promotions. \", Esme responded \"No\".    Esme states her symptoms started gradually 1 month or more ago.   Her symptoms consist of rhinitis, facial pain or pressure, nasal congestion, and a headache.   Symptom details     Nasal secretions: The color of her mucus is yellow.    Facial pain or pressure: She has not had the facial pain or pressure for 12 weeks or more. The facial pain or pressure feels worse when bending over or leaning forward.     Headache: Esme has not had the headache for 12 weeks or more. She states the headache is mild (1-3 on a 10 point pain scale).      Esme denies having fever, myalgias, sore throat, cough, vomiting, nausea, teeth pain, ageusia, anosmia, diarrhea, ear pain, malaise, wheezing, enlarged lymph nodes, and chills. She also denies taking antibiotic medication for the symptoms, double sickening (worsening symptoms after initial improvement), and having recent facial or sinus surgery in the past 60 days. She is not experiencing dyspnea.    Pertinent COVID-19 (Coronavirus) information  Esme does not work or volunteer as healthcare worker or a  and does not work or volunteer in a healthcare facility.   She does not live with a healthcare worker.   Esme has not had a close contact with a laboratory-confirmed COVID-19 patient within 14 days of symptom onset. She also has not had a close contact with a suspected COVID-19 patient within 14 days of symptom onset.   Pertinent medical history  " Esme had 1 sinus infection within the past year.   Esme does not get yeast infections when she takes antibiotics.   Esme does not need a return to work/school note.   Weight: 143 lbs   Esme does not smoke or use smokeless tobacco.   Additional information as reported by the patient (free text): facial pain on right cheekbone   Weight: 143 lbs    MEDICATIONS: Flonase Allergy Relief nasal, Vivelle-Dot transdermal, Celexa oral, ALLERGIES: NKDA  Clinician Response:  Dear Esme,  Based on the information provided, you have acute bacterial sinusitis, also known as a sinus infection. Sinus infections are caused by bacteria or a virus and symptoms are almost always identical. The difference between the 2 types of infections is timing.  Sinus infections start as viral infections and symptoms improve on their own in about 7 days. If symptoms have not improved after 7 days or have even worsened, a bacterial infection may have developed.  Medication information  I am prescribing:       Fluticasone 50 mcg/actuation nasal spray. Inhale 2 sprays in each nostril 1 time per day; after 1 week, may adjust to 1 - 2 sprays in each nostril 1 time per day. This medication takes several days to start working, so keep taking it even if it doesn't help right away. There are no refills with this prescription.      Amoxicillin-pot clavulanate 875-125 mg oral tablet. Take 1 tablet by mouth every 12 hours for 10 days. There are no refills with this prescription.     Yeast infections can be a common side effect of antibiotics. The most common symptom of a yeast infection is itchiness in and around the vagina. Other signs and symptoms include burning, redness, or a thick, white vaginal discharge that looks like cottage cheese and does not have a bad smell.  Self care  Steps you can take to be as comfortable as possible:     Rest.    Drink plenty of fluids.    Take a warm shower to loosen congestion    Use a cool-mist  humidifier.     When to seek care  Please be seen in a clinic or urgent care if any of the following occur:     New symptoms develop, or symptoms become worse    Symptoms do not start to improve after 3 days of treatment     Call ahead before going to the clinic or urgent care.  It is possible to have an allergic reaction to an antibiotic even if you have not had one in the past. If you notice a new rash, significant swelling, or difficulty breathing, stop taking this medication immediately and go to a clinic or urgent care.  COVID-19 (Coronavirus) General Information  Because there is currently no vaccine to prevent infection, the best way to protect yourself is to avoid being exposed to this virus. Common symptoms of COVID-19 include but are not limited to fever, cough, and shortness of breath. These symptoms appear 2-14 days after you are exposed to the virus that causes COVID-19. Click here for more information from the CDC on how to protect yourself.  If you are sick with COVID-19 or suspect you are infected with the virus that causes COVID-19, follow the steps here from the CDC to help prevent the disease from spreading to people in your home and community.  Click here for general information from the CDC on testing.  If you develop any of these emergency warning signs for COVID-19, get medical attention immediately:     Trouble breathing    Persistent pain or pressure in the chest    New confusion or inability to arouse    Bluish lips or face      Call your doctor or clinic before going in. Call 911 if you have a medical emergency and notify the  you have or think you may have COVID-19.  For more detailed and up to date information on COVID-19 (Coronavirus), please visit the CDC website.   Diagnosis: Acute bacterial sinusitis  Diagnosis ICD: J01.90  Prescription: fluticasone 50 mcg/actuation nasal spray,suspension 1 120 spray aerosol with adapter (grams), 30 days supply. Inhale 2 sprays in each  nostril 1 time per day; after 1 week, may adjust to 1 - 2 sprays in each nostril 1 time per day.. Refills: 0, Refill as needed: no, Allow substitutions: yes  Prescription: amoxicillin-pot clavulanate 875-125 mg oral tablet 20 tablet, 10 days supply. Take 1 tablet by mouth every 12 hours for 10 days. Refills: 0, Refill as needed: no, Allow substitutions: yes  Pharmacy: [facility] - [phone] - [address], [place] [address]

## 2020-11-07 ENCOUNTER — HEALTH MAINTENANCE LETTER (OUTPATIENT)
Age: 55
End: 2020-11-07

## 2020-12-17 ENCOUNTER — OFFICE VISIT (OUTPATIENT)
Dept: DERMATOLOGY | Facility: CLINIC | Age: 55
End: 2020-12-17
Payer: COMMERCIAL

## 2020-12-17 DIAGNOSIS — L81.8 IDIOPATHIC GUTTATE HYPOMELANOSIS: ICD-10-CM

## 2020-12-17 DIAGNOSIS — L81.4 SOLAR LENTIGO: ICD-10-CM

## 2020-12-17 DIAGNOSIS — D22.9 MULTIPLE BENIGN NEVI: ICD-10-CM

## 2020-12-17 DIAGNOSIS — D18.01 CHERRY ANGIOMA: ICD-10-CM

## 2020-12-17 DIAGNOSIS — Z85.828 HISTORY OF NONMELANOMA SKIN CANCER: Primary | ICD-10-CM

## 2020-12-17 DIAGNOSIS — L57.8 SUN-DAMAGED SKIN: ICD-10-CM

## 2020-12-17 DIAGNOSIS — L82.1 SEBORRHEIC KERATOSIS: ICD-10-CM

## 2020-12-17 PROBLEM — Z79.890 HORMONE REPLACEMENT THERAPY: Status: ACTIVE | Noted: 2020-12-17

## 2020-12-17 PROBLEM — M16.10 PRIMARY LOCALIZED OSTEOARTHRITIS OF PELVIC REGION AND THIGH: Status: ACTIVE | Noted: 2020-12-17

## 2020-12-17 PROBLEM — F41.1 GENERALIZED ANXIETY DISORDER: Status: ACTIVE | Noted: 2020-12-17

## 2020-12-17 PROCEDURE — 99214 OFFICE O/P EST MOD 30 MIN: CPT | Performed by: DERMATOLOGY

## 2020-12-17 ASSESSMENT — PAIN SCALES - GENERAL: PAINLEVEL: NO PAIN (0)

## 2020-12-17 NOTE — PROGRESS NOTES
Esme Tovar's goals for this visit include:   Chief Complaint   Patient presents with     Skin Check     Personal hx NMSC, no family hx. Not immunosuppressed. Areas of concern: itchy bumps on arm and dry spot on left inner thigh       She requests these members of her care team be copied on today's visit information: no    PCP: Raegan De Souza    Referring Provider:  No referring provider defined for this encounter.    There were no vitals taken for this visit.    Do you need any medication refills at today's visit? No  Mahogany Joshua LPN

## 2020-12-17 NOTE — LETTER
"    12/17/2020         RE: Esme Tovar  [address]  [facility] [address]        Dear Colleague,    Thank you for referring your patient, Esme Tovar, to the [facility]. Please see a copy of my visit note below.    [facility] Dermatology Note    Dermatology Problem List:  1. Hx of NMSC  - infundibulocystic BCC, left zygomatic cheek, s/p biopsy 5/23/2019, s/p Mohs 6/17/19  2. Melasma, not treating  3. Eczema, mild  -Current t/x: cream based moisturizer    Encounter Date: Dec 17, 2020    CC:  Chief Complaint   Patient presents with     Skin Check     Personal hx NMSC, no family hx. Not immunosuppressed. Areas of concern: itchy bumps on arm and dry spot on left inner thigh     History of Present Illness:  Ms. Esme Tovar is a 55 year old female with history of NMSC who presents as a follow-up skin check.     She was last seen on 12/12/19 when skin check was completed and only benign findings were noted. It was planned for her to come back in 6 months for next skin check, but this was delayed by the COVID-19 pandemic.     Today, she has concerns about an itchy dry spot on her arm and inner left thigh. She's been using some [employer] \"smelly stuff\" as moisturizer, as well as Cerave and Vanicream sometimes. She had applied Vaseline to the spot on her inner left thigh. She reports no new or changing lesions. Denies any tender, nonhealing, bleeding skin lesions.     No other concerns addressed today.    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   Reviewed and left in chart for clinician convenience.     Family History:  No family history of skin cancer.   Reviewed and left in chart for clinician convenience.     Medications:  Current Outpatient Medications   Medication " Sig Dispense Refill     citalopram (CELEXA) 20 MG tablet   3     estradiol (VIVELLE-DOT) 0.075 MG/24HR BIW patch   0       No Known Allergies    Review of Systems:  -Constitutional: Patient is otherwise feeling well, in usual state of health.   -Skin: As above in HPI. No additional skin concerns.    Physical exam:  Vitals: There were no vitals taken for this visit.  GEN: This is a well developed, well-nourished female in no acute distress, in a pleasant mood.    SKIN: Total skin excluding the undergarment areas was performed. The exam included the head/face, neck, both arms, chest, back, abdomen, both legs, digits and/or nails and buttocks.   - Mchugh Type II  - Scattered brown macules on sun exposed areas.  - Skin is overall xerotic, particularly on the upper extremities  - Well healed linear scar on the left zygomatic cheek  - There are dome shaped bright red papules on the trunk.  - Multiple regular brown pigmented macules and papules are identified on the body.   - There are waxy stuck on tan to brown papules on the trunk, left inner thigh  - Shoulder and upper extremities: hypopigmented macules with scalloped edge borders  - No other lesions of concern on areas examined.     Impression/Plan:  1. History of NMSC. No evidence of recurrence.   - Recommend sunscreens SPF #30 or greater, protective clothing and avoidance of tanning beds.   - Recommended skin exam in 12 months.    2. Sun damaged skin with solar lentigines, idiopathic guttate hypomelanosis  - Recommend sunscreens SPF #30 or greater, protective clothing and avoidance of tanning beds.    3. Benign findings including: seborrheic keratoses, cherry angioma, idiopathic guttate hypomelanosis   - No further intervention required. Patient to report changes.   - Patient reassured of the benign nature of these lesions.    4. Multiple clinically benign nevi  - No further intervention required. Patient to report changes.   - Patient reassured of the benign  nature of these lesions.    5. Xerosis cutis. Discussed the pathogenesis of this condition and empathized the importance of gentle skin care and cream based moisturizer for long term treatment.   - I recommended a cream based moisturizer, like CeraVe or Vanicream, especially in the winter.     Follow-up in 12 months for skin check, sooner for lesions of concern.     Staff Involved:  Scribe/Staff    Scribe Disclosure:   I, Wilian Rivera, am serving as a scribe to document services personally performed by this physician, Dr. Na Camacho, based on data collection and the provider's statements to me.     Provider Disclosure:   The documentation recorded by the scribe accurately reflects the services I personally performed and the decisions made by me.    Na Camacho MD    Department of Dermatology  [facility]: Phone: [phone], Fax:[phone]  [facility]: Phone: [phone], Fax: [phone]      Esme Tovar's goals for this visit include:   Chief Complaint   Patient presents with     Skin Check     Personal hx NMSC, no family hx. Not immunosuppressed. Areas of concern: itchy bumps on arm and dry spot on left inner thigh       She requests these members of her care team be copied on today's visit information: no    PCP: Raegan De Souza    Referring Provider:  No referring provider defined for this encounter.    There were no vitals taken for this visit.    Do you need any medication refills at today's visit? No  Mahogany Joshua LPN          Again, thank you for allowing me to participate in the care of your patient.        Sincerely,        Na Camacho MD

## 2020-12-17 NOTE — PROGRESS NOTES
"[facility] Dermatology Note    Dermatology Problem List:  1. Hx of NMSC  - infundibulocystic BCC, left zygomatic cheek, s/p biopsy 5/23/2019, s/p Mohs 6/17/19  2. Melasma, not treating  3. Eczema, mild  -Current t/x: cream based moisturizer    Encounter Date: Dec 17, 2020    CC:  Chief Complaint   Patient presents with     Skin Check     Personal hx NMSC, no family hx. Not immunosuppressed. Areas of concern: itchy bumps on arm and dry spot on left inner thigh     History of Present Illness:  Ms. Esme Tovar is a 55 year old female with history of NMSC who presents as a follow-up skin check.     She was last seen on 12/12/19 when skin check was completed and only benign findings were noted. It was planned for her to come back in 6 months for next skin check, but this was delayed by the COVID-19 pandemic.     Today, she has concerns about an itchy dry spot on her arm and inner left thigh. She's been using some [employer] \"smelly stuff\" as moisturizer, as well as Cerave and Vanicream sometimes. She had applied Vaseline to the spot on her inner left thigh. She reports no new or changing lesions. Denies any tender, nonhealing, bleeding skin lesions.     No other concerns addressed today.    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   Reviewed and left in chart for clinician convenience.     Family History:  No family history of skin cancer.   Reviewed and left in chart for clinician convenience.     Medications:  Current Outpatient Medications   Medication Sig Dispense Refill     citalopram (CELEXA) 20 MG tablet   3     estradiol (VIVELLE-DOT) 0.075 MG/24HR BIW patch   0       No Known Allergies    Review of Systems:  -Constitutional: Patient is otherwise feeling well, in usual state of health.   -Skin: As above in HPI. " No additional skin concerns.    Physical exam:  Vitals: There were no vitals taken for this visit.  GEN: This is a well developed, well-nourished female in no acute distress, in a pleasant mood.    SKIN: Total skin excluding the undergarment areas was performed. The exam included the head/face, neck, both arms, chest, back, abdomen, both legs, digits and/or nails and buttocks.   - Mchugh Type II  - Scattered brown macules on sun exposed areas.  - Skin is overall xerotic, particularly on the upper extremities  - Well healed linear scar on the left zygomatic cheek  - There are dome shaped bright red papules on the trunk.  - Multiple regular brown pigmented macules and papules are identified on the body.   - There are waxy stuck on tan to brown papules on the trunk, left inner thigh  - Shoulder and upper extremities: hypopigmented macules with scalloped edge borders  - No other lesions of concern on areas examined.     Impression/Plan:  1. History of NMSC. No evidence of recurrence.   - Recommend sunscreens SPF #30 or greater, protective clothing and avoidance of tanning beds.   - Recommended skin exam in 12 months.    2. Sun damaged skin with solar lentigines, idiopathic guttate hypomelanosis  - Recommend sunscreens SPF #30 or greater, protective clothing and avoidance of tanning beds.    3. Benign findings including: seborrheic keratoses, cherry angioma, idiopathic guttate hypomelanosis   - No further intervention required. Patient to report changes.   - Patient reassured of the benign nature of these lesions.    4. Multiple clinically benign nevi  - No further intervention required. Patient to report changes.   - Patient reassured of the benign nature of these lesions.    5. Xerosis cutis. Discussed the pathogenesis of this condition and empathized the importance of gentle skin care and cream based moisturizer for long term treatment.   - I recommended a cream based moisturizer, like CeraVe or Vanicream,  especially in the winter.     Follow-up in 12 months for skin check, sooner for lesions of concern.     Staff Involved:  Scribe/Staff    Scribe Disclosure:   I, Wilian Rivera, am serving as a scribe to document services personally performed by this physician, Dr. Na Camacho, based on data collection and the provider's statements to me.     Provider Disclosure:   The documentation recorded by the scribe accurately reflects the services I personally performed and the decisions made by me.    Na Camacho MD    Department of Dermatology  [facility]: Phone: [phone], Fax:[phone]  [facility]: Phone: [phone], Fax: [phone]

## 2021-05-16 ENCOUNTER — HEALTH MAINTENANCE LETTER (OUTPATIENT)
Age: 56
End: 2021-05-16

## 2021-09-05 ENCOUNTER — HEALTH MAINTENANCE LETTER (OUTPATIENT)
Age: 56
End: 2021-09-05

## 2021-12-26 ENCOUNTER — HEALTH MAINTENANCE LETTER (OUTPATIENT)
Age: 56
End: 2021-12-26

## 2022-10-23 ENCOUNTER — HEALTH MAINTENANCE LETTER (OUTPATIENT)
Age: 57
End: 2022-10-23

## 2023-04-02 ENCOUNTER — HEALTH MAINTENANCE LETTER (OUTPATIENT)
Age: 58
End: 2023-04-02

## 2023-06-01 ENCOUNTER — HEALTH MAINTENANCE LETTER (OUTPATIENT)
Age: 58
End: 2023-06-01

## 2024-06-08 ENCOUNTER — HEALTH MAINTENANCE LETTER (OUTPATIENT)
Age: 59
End: 2024-06-08